# Patient Record
Sex: FEMALE | Race: WHITE | NOT HISPANIC OR LATINO | URBAN - METROPOLITAN AREA
[De-identification: names, ages, dates, MRNs, and addresses within clinical notes are randomized per-mention and may not be internally consistent; named-entity substitution may affect disease eponyms.]

---

## 2022-06-08 ENCOUNTER — LAB OUTSIDE AN ENCOUNTER (OUTPATIENT)
Dept: URBAN - METROPOLITAN AREA CLINIC 70 | Facility: CLINIC | Age: 32
End: 2022-06-08

## 2022-06-08 ENCOUNTER — OFFICE VISIT (OUTPATIENT)
Dept: URBAN - METROPOLITAN AREA CLINIC 70 | Facility: CLINIC | Age: 32
End: 2022-06-08
Payer: COMMERCIAL

## 2022-06-08 VITALS
SYSTOLIC BLOOD PRESSURE: 121 MMHG | TEMPERATURE: 98.4 F | BODY MASS INDEX: 28.17 KG/M2 | DIASTOLIC BLOOD PRESSURE: 81 MMHG | HEIGHT: 63 IN | WEIGHT: 159 LBS | HEART RATE: 94 BPM

## 2022-06-08 DIAGNOSIS — K21.9 GASTROESOPHAGEAL REFLUX DISEASE, UNSPECIFIED WHETHER ESOPHAGITIS PRESENT: ICD-10-CM

## 2022-06-08 DIAGNOSIS — K59.00 CONSTIPATION, UNSPECIFIED CONSTIPATION TYPE: ICD-10-CM

## 2022-06-08 DIAGNOSIS — R10.9 LEFT SIDED ABDOMINAL PAIN: ICD-10-CM

## 2022-06-08 PROCEDURE — 99204 OFFICE O/P NEW MOD 45 MIN: CPT | Performed by: NURSE PRACTITIONER

## 2022-06-08 RX ORDER — OMEPRAZOLE 40 MG/1
1 CAPSULE 30 MINUTES BEFORE MORNING MEAL CAPSULE, DELAYED RELEASE ORAL ONCE A DAY
Qty: 90 | Refills: 3 | OUTPATIENT
Start: 2022-06-08

## 2022-06-08 NOTE — HPI-TODAY'S VISIT:
Patient is a 32 y/o female who presents today for evaluation of left sided abdominal pain. Pain has been intermittent for last 2-3 months. Pain lasts for minutes and then resolves. Has had an associated recent change in bowel habit with going from 2-3BMs /day with loose stool s/p CCY in 2019 to now having BM x 1 every 3 days with feeling of incomlete evacuation. Underwent abdominal u/s by PCP on 4/18 for symptoms that was unremarkable. Labs 4/1/22 showed LFTs WNL. No recent new medications or narcotics. No recent stressful life events. Admit to also having frequent heartburn on averge 3 days per week. Last EGD was in 2019 during workup of gallbladder with findings of esophagitis and gastritis. No NSAIDs. Not currently on medication for heartburn. No prior colonoscopy. Denies wt loss, CP, SOB, dysphagia, odynophagia, N/V, or signs of GI bleeding.

## 2022-07-20 ENCOUNTER — OFFICE VISIT (OUTPATIENT)
Dept: URBAN - METROPOLITAN AREA CLINIC 70 | Facility: CLINIC | Age: 32
End: 2022-07-20
Payer: COMMERCIAL

## 2022-07-20 ENCOUNTER — LAB OUTSIDE AN ENCOUNTER (OUTPATIENT)
Dept: URBAN - METROPOLITAN AREA CLINIC 70 | Facility: CLINIC | Age: 32
End: 2022-07-20

## 2022-07-20 ENCOUNTER — WEB ENCOUNTER (OUTPATIENT)
Dept: URBAN - METROPOLITAN AREA CLINIC 70 | Facility: CLINIC | Age: 32
End: 2022-07-20

## 2022-07-20 VITALS
HEIGHT: 63 IN | TEMPERATURE: 98.1 F | BODY MASS INDEX: 28.07 KG/M2 | WEIGHT: 158.4 LBS | SYSTOLIC BLOOD PRESSURE: 111 MMHG | DIASTOLIC BLOOD PRESSURE: 78 MMHG | HEART RATE: 74 BPM

## 2022-07-20 DIAGNOSIS — K21.9 GASTROESOPHAGEAL REFLUX DISEASE, UNSPECIFIED WHETHER ESOPHAGITIS PRESENT: ICD-10-CM

## 2022-07-20 DIAGNOSIS — K59.00 CONSTIPATION, UNSPECIFIED CONSTIPATION TYPE: ICD-10-CM

## 2022-07-20 DIAGNOSIS — R10.9 LEFT SIDED ABDOMINAL PAIN: ICD-10-CM

## 2022-07-20 PROBLEM — 235595009: Status: ACTIVE | Noted: 2022-06-08

## 2022-07-20 PROBLEM — 14760008: Status: ACTIVE | Noted: 2022-06-08

## 2022-07-20 PROCEDURE — 99214 OFFICE O/P EST MOD 30 MIN: CPT | Performed by: NURSE PRACTITIONER

## 2022-07-20 RX ORDER — OMEPRAZOLE 40 MG/1
1 CAPSULE 30 MINUTES BEFORE MORNING MEAL CAPSULE, DELAYED RELEASE ORAL ONCE A DAY
OUTPATIENT
Start: 2022-06-08

## 2022-07-20 RX ORDER — OMEPRAZOLE 40 MG/1
1 CAPSULE 30 MINUTES BEFORE MORNING MEAL CAPSULE, DELAYED RELEASE ORAL ONCE A DAY
Qty: 90 | Refills: 3 | Status: ACTIVE | COMMUNITY
Start: 2022-06-08

## 2022-07-20 NOTE — HPI-TODAY'S VISIT:
OV 6/8/22: Patient is a 32 y/o female who presents today for evaluation of left sided abdominal pain. Pain has been intermittent for last 2-3 months. Pain lasts for minutes and then resolves. Has had an associated recent change in bowel habit with going from 2-3BMs /day with loose stool s/p CCY in 2019 to now having BM x 1 every 3 days with feeling of incomlete evacuation. Underwent abdominal u/s by PCP on 4/18 for symptoms that was unremarkable. Labs 4/1/22 showed LFTs WNL. No recent new medications or narcotics. No recent stressful life events. Admit to also having frequent heartburn on averge 3 days per week. Last EGD was in 2019 during workup of gallbladder with findings of esophagitis and gastritis. No NSAIDs. Not currently on medication for heartburn. No prior colonoscopy. Denies wt loss, CP, SOB, dysphagia, odynophagia, N/V, or signs of GI bleeding. -------------------------------------------------------------------- Today 7/20/22: Patient presents today for follow up. She underwent abdominal CT on 6/29/22 that showed a Lynda duct cyst but no acute GI process. Results discussed with patient. Since last OV, patient has been compliant with taking daily PPI with GERD symptoms now resolved and has been taking daily Miralax with constipation now resolved with BM x 2/day. Admits to continued LUQ pain that radiates around her side on top of her ribs. Pain is not correlated with eating. Is present with movement. No new GI complaints. Denies fever, N/V, diarrhea, or signs of GI bleeding.

## 2022-08-23 ENCOUNTER — OFFICE VISIT (OUTPATIENT)
Dept: URBAN - METROPOLITAN AREA SURGERY CENTER 24 | Facility: SURGERY CENTER | Age: 32
End: 2022-08-23
Payer: COMMERCIAL

## 2022-08-23 DIAGNOSIS — R10.13 ABDOMINAL DISCOMFORT, EPIGASTRIC: ICD-10-CM

## 2022-08-23 PROCEDURE — 43235 EGD DIAGNOSTIC BRUSH WASH: CPT | Performed by: INTERNAL MEDICINE

## 2022-08-23 PROCEDURE — G8907 PT DOC NO EVENTS ON DISCHARG: HCPCS | Performed by: INTERNAL MEDICINE

## 2022-09-06 ENCOUNTER — OFFICE VISIT (OUTPATIENT)
Dept: URBAN - METROPOLITAN AREA CLINIC 70 | Facility: CLINIC | Age: 32
End: 2022-09-06

## 2022-12-23 ENCOUNTER — WEB ENCOUNTER (OUTPATIENT)
Dept: URBAN - METROPOLITAN AREA CLINIC 70 | Facility: CLINIC | Age: 32
End: 2022-12-23

## 2022-12-23 RX ORDER — OMEPRAZOLE 40 MG/1
1 CAPSULE 30 MINUTES BEFORE MORNING MEAL CAPSULE, DELAYED RELEASE ORAL ONCE A DAY
Qty: 90 | Refills: 3
Start: 2022-06-08

## 2022-12-27 ENCOUNTER — OFFICE VISIT (OUTPATIENT)
Dept: URBAN - METROPOLITAN AREA CLINIC 70 | Facility: CLINIC | Age: 32
End: 2022-12-27

## 2022-12-27 RX ORDER — OMEPRAZOLE 40 MG/1
1 CAPSULE 30 MINUTES BEFORE MORNING MEAL CAPSULE, DELAYED RELEASE ORAL ONCE A DAY
Status: ACTIVE | COMMUNITY
Start: 2022-06-08

## 2024-03-20 ENCOUNTER — OV EP (OUTPATIENT)
Dept: URBAN - METROPOLITAN AREA CLINIC 70 | Facility: CLINIC | Age: 34
End: 2024-03-20
Payer: COMMERCIAL

## 2024-03-20 VITALS
TEMPERATURE: 98.2 F | BODY MASS INDEX: 30.9 KG/M2 | HEIGHT: 63 IN | DIASTOLIC BLOOD PRESSURE: 77 MMHG | SYSTOLIC BLOOD PRESSURE: 113 MMHG | HEART RATE: 80 BPM | WEIGHT: 174.4 LBS

## 2024-03-20 DIAGNOSIS — K58.1 IRRITABLE BOWEL SYNDROME WITH CONSTIPATION: ICD-10-CM

## 2024-03-20 PROCEDURE — 99214 OFFICE O/P EST MOD 30 MIN: CPT | Performed by: REGISTERED NURSE

## 2024-03-20 RX ORDER — OMEPRAZOLE 40 MG/1
1 CAPSULE 30 MINUTES BEFORE MORNING MEAL CAPSULE, DELAYED RELEASE ORAL ONCE A DAY
Status: ACTIVE | COMMUNITY
Start: 2022-06-08

## 2024-03-20 NOTE — PHYSICAL EXAM NECK/THYROID:
----- Message from Denys Ortiz MD sent at 6/23/2023  7:20 AM CDT -----  Please notify the patient of normal midnight salivary cortisol results. Since this was negative, we discussed excising the soft tissue mass of her posterior neck in the OR if she would like. Would you mind calling her to schedule? If she would like to talk again in the office as a preop, she may also schedule that   normal appearance ,  no deformities , trachea midline , no masses , no JVD

## 2024-03-20 NOTE — HPI-TODAY'S VISIT:
Pt presents with abdominal pain. Pain has been present for several months. Pain is located in the RUQ. Pain occurs for hours at a time. Pain is described as sharp and crampy. Pain is aggravated by nothing in particular. Pain improves some with BM but does not resolve completely. Pain occurs daily. Associated symptoms are constipation and bloating. Bowels move every 2-3 days. CT scan on 2/12/24 showed no acute findings. She is s/p cholecystectomy. EGD 8/23/22 was normal. No prior colonoscopy.

## 2024-05-01 ENCOUNTER — OFFICE VISIT (OUTPATIENT)
Dept: URBAN - METROPOLITAN AREA CLINIC 70 | Facility: CLINIC | Age: 34
End: 2024-05-01

## 2024-05-01 RX ORDER — OMEPRAZOLE 40 MG/1
1 CAPSULE 30 MINUTES BEFORE MORNING MEAL CAPSULE, DELAYED RELEASE ORAL ONCE A DAY
Status: ACTIVE | COMMUNITY
Start: 2022-06-08

## 2024-05-20 ENCOUNTER — OFFICE VISIT (OUTPATIENT)
Dept: URBAN - METROPOLITAN AREA CLINIC 70 | Facility: CLINIC | Age: 34
End: 2024-05-20
Payer: COMMERCIAL

## 2024-05-20 ENCOUNTER — DASHBOARD ENCOUNTERS (OUTPATIENT)
Age: 34
End: 2024-05-20

## 2024-05-20 VITALS
TEMPERATURE: 98.7 F | DIASTOLIC BLOOD PRESSURE: 79 MMHG | HEART RATE: 90 BPM | SYSTOLIC BLOOD PRESSURE: 115 MMHG | HEIGHT: 63 IN | BODY MASS INDEX: 31.08 KG/M2 | WEIGHT: 175.4 LBS

## 2024-05-20 DIAGNOSIS — K58.1 IRRITABLE BOWEL SYNDROME WITH CONSTIPATION: ICD-10-CM

## 2024-05-20 PROCEDURE — 99213 OFFICE O/P EST LOW 20 MIN: CPT | Performed by: REGISTERED NURSE

## 2024-05-20 RX ORDER — OMEPRAZOLE 40 MG/1
1 CAPSULE 30 MINUTES BEFORE MORNING MEAL CAPSULE, DELAYED RELEASE ORAL ONCE A DAY
Status: ACTIVE | COMMUNITY
Start: 2022-06-08